# Patient Record
Sex: FEMALE
[De-identification: names, ages, dates, MRNs, and addresses within clinical notes are randomized per-mention and may not be internally consistent; named-entity substitution may affect disease eponyms.]

---

## 2020-03-26 ENCOUNTER — HOSPITAL ENCOUNTER (INPATIENT)
Dept: HOSPITAL 56 - MW.OB | Age: 35
LOS: 2 days | Discharge: HOME | End: 2020-03-28
Attending: OBSTETRICS & GYNECOLOGY | Admitting: OBSTETRICS & GYNECOLOGY
Payer: MEDICAID

## 2020-03-26 DIAGNOSIS — O34.219: ICD-10-CM

## 2020-03-26 DIAGNOSIS — Z3A.38: ICD-10-CM

## 2020-03-26 PROCEDURE — 10D00Z2 EXTRACTION OF PRODUCTS OF CONCEPTION, EXTRAPERITONEAL, OPEN APPROACH: ICD-10-PCS | Performed by: OBSTETRICS & GYNECOLOGY

## 2020-03-26 RX ADMIN — KETOROLAC TROMETHAMINE SCH MG: 30 INJECTION, SOLUTION INTRAMUSCULAR at 09:35

## 2020-03-26 RX ADMIN — KETOROLAC TROMETHAMINE SCH MG: 30 INJECTION, SOLUTION INTRAMUSCULAR at 19:23

## 2020-03-26 NOTE — PCM.PREANE
Preanesthetic Assessment





- Anesthesia/Transfusion/Family Hx


Anesthesia History: Prior Anesthesia Without Reaction (Epidural, Spinal, and GA 

without complications)


Family History of Anesthesia Reaction: No


Transfusion History: No Prior Transfusion(s)





- Review of Systems


General: No Symptoms


Pulmonary: No Symptoms


Cardiovascular: No Symptoms


Gastrointestinal: No Symptoms


Neurological: No Symptoms


Other: Reports: None





- Physical Assessment


NPO Status Date: 20


NPO Status Time: 22:00


Height: 5 ft 3 in


Weight: 101.605 kg


ASA Class: 2


Mental Status: Alert & Oriented x3


Airway Class: Mallampati = 2


Dentition: Reports: Normal Dentition


Thyro-Mental Finger Breadths: 3


Mouth Opening Finger Breadths: 3


ROM/Head Extension: Full


Lungs: Clear to Auscultation, Normal Respiratory Effort


Cardiovascular: Regular Rate, Regular Rhythm





- Lab


Values: 





 Laboratory Last Values











WBC  11.03 K/uL (4.0-11.0)  H  20  05:54    


 


RBC  4.36 M/uL (4.30-5.90)   20  05:54    


 


Hgb  10.3 g/dL (12.0-16.0)  L  20  05:54    


 


Hct  32.8 % (36.0-46.0)  L  20  05:54    


 


MCV  75.2 fL (80.0-98.0)  L  20  05:54    


 


MCH  23.6 pg (27.0-32.0)  L  20  05:54    


 


MCHC  31.4 g/dL (31.0-37.0)   20  05:54    


 


RDW Std Deviation  42.0 fl (28.0-62.0)   20  05:54    


 


RDW Coeff of Kaushik  15 % (11.0-15.0)   20  05:54    


 


Plt Count  216 K/uL (150-400)   20  05:54    


 


MPV  11.80 fL (7.40-12.00)   20  05:54    


 


Nucleated RBC %  0.0 /100WBC  20  05:54    


 


Nucleated RBCs #  0 K/uL  20  05:54    


 


Blood Type  A POSITIVE   20  05:54    


 


Antibody Screen  NEGATIVE   20  05:54    














- Allergies


Allergies/Adverse Reactions: 


 Allergies











Allergy/AdvReac Type Severity Reaction Status Date / Time


 


aspirin Allergy  Rash Verified 20 05:26


 


Latex, Natural Rubber Allergy  Rash Verified 20 06:30


 


Penicillins Allergy  Difficulty Verified 20 05:26





   Breathing  














- Acknowledgements


Anesthesia Type Planned: Spinal


Pt an Appropriate Candidate for the Planned Anesthesia: Yes


Alternatives and Risks of Anesthesia Discussed w Pt/Guardian: Yes


Pt/Guardian Understands and Agrees with Anesthesia Plan: Yes





PreAnesthesia Questionnaire





- Past Health History


Medical/Surgical History: Denies Medical/Surgical History


HEENT History: 


Other HEENT History: dental implants


Cardiovascular History: Reports: None


Respiratory History: Reports: Asthma (Last inhaler use at least 6 months ago, 

exercise induced)


Gastrointestinal History: Reports: None


Genitourinary History: Reports: Other (See Below)


Other Genitourinary History: hx UTI during pregnancy


OB/GYN History: Reports: Pregnancy


: 3


Para: 2


LMP (Approximate): Pregnant


Musculoskeletal History: Reports: None


Neurological History: Reports: None


Psychiatric History: Reports: None


Endocrine/Metabolic History: Reports: Obesity/BMI 30+


Hematologic History: Reports: Anemia


Immunologic History: Reports: None


Oncologic (Cancer) History: Reports: Bone


Other Oncologic History: Right thigh bone cancer - pt states this was excised 

and states its resolved to her knowledge


Dermatologic History: Reports: None





- Infectious Disease History


Infectious Disease History: Reports: None





- Past Surgical History


Head Surgeries/Procedures: Reports: None


HEENT Surgical History: Reports: None


Cardiovascular Surgical History: Reports: None


Respiratory Surgical History: Reports: None


GI Surgical History: Reports: None


Female  Surgical History: Reports:  Section (x2)


Endocrine Surgical History: Reports: None


Neurological Surgical History: Reports: None


Musculoskeletal Surgical History: Reports: Other (See Below)


Other Musculoskeletal Surgeries/Procedures:: hx rt leg surgery for a "rare form 

of cancer" as a teenager


Oncologic Surgical History: Reports: None


Dermatological Surgical History: Reports: None





- SUBSTANCE USE


Smoking Status *Q: Never Smoker


Tobacco Use Within Last Twelve Months: No


Second Hand Smoke Exposure: No


Recreational Drug Use History: No





- HOME MEDS


Home Medications: 


 Home Meds





Pnv No.95/Ferrous Fum/Folic AC [Prenatal Vitamin Tablet] 1 tab PO DAILY  [History]











- CURRENT (IN HOUSE) MEDS


Current Meds: 





 Current Medications





Lactated Ringer's (Ringers, Lactated)  1,000 mls @ 500 mls/hr IV BOLUS [facility]


   Last Admin: 20 05:55 Dose:  500 mls/hr


Oxytocin/Sodium Chloride (Oxytocin 30 Unit/500 Ml-Ns)  30 unit in 500 mls @ 250 

mls/hr IV TITRATE EDUARD


Sodium Chloride (Saline Flush)  10 ml FLUSH ASDIRECTED PRN


   PRN Reason: Keep Vein Open


Sodium Chloride (Saline Flush)  2.5 ml FLUSH ASDIRECTED PRN


   PRN Reason: Keep Vein Open


Sodium Chloride (Normal Saline)  10 ml IV ASDIRECTED PRN


   PRN Reason: IV Use





Discontinued Medications





Citric Acid/Sodium Citrate (Bicitra Solution)  30 ml PO ONETIME ONE


   Stop: 20 05:28


Clindamycin Phosphate 600 mg/ (Premix)  50 mls @ 100 mls/hr IV ONETIME ONE


   Stop: 20 06:14

## 2020-03-26 NOTE — PCM.OPNOTE
- General Post-Op/Procedure Note


Date of Surgery/Procedure: 03/26/20


Operative Procedure(s): Repeat C/section.


Pre Op Diagnosis: Previous C/section.GLI41mes.


Post-Op Diagnosis: Same


Anesthesia Technique: Spinal


Primary Surgeon: Adrien Wells


Assistant: Rafia Russell


EBL in mLs: 600


Complications: None


Condition: Good

## 2020-03-26 NOTE — PCM.LDHP
L&D History of Present Illness





- General


Date of Service: 20


Admit Problem/Dx: 


 Patient Status Order with Admit Dx/Problem





20 05:15


Patient Status [ADT] Routine 








 Admission Diagnosis/Problem











Admission Diagnosis/Problem    Pregnancy














20 07:59


35 yo  admitted at 38 4/7 weeks for repeat  delivery by Dr. Wells; A

+, Rubella Immune, GBS positive


Source of Information: Patient


History Limitations: Reports: No Limitations





- Related Data


Allergies/Adverse Reactions: 


 Allergies











Allergy/AdvReac Type Severity Reaction Status Date / Time


 


aspirin Allergy  Rash Verified 20 05:26


 


Latex, Natural Rubber Allergy  Rash Verified 20 06:30


 


Penicillins Allergy  Difficulty Verified 20 05:26





   Breathing  











Home Medications: 


 Home Meds





Pnv No.95/Ferrous Fum/Folic AC [Prenatal Vitamin Tablet] 1 tab PO DAILY  [History]











Past Medical History





- Past Health History


Medical/Surgical History: Denies Medical/Surgical History


HEENT History: 


Other HEENT History: dental implants


Cardiovascular History: Reports: None


Respiratory History: Reports: Asthma (Last inhaler use at least 6 months ago, 

exercise induced)


Gastrointestinal History: Reports: None


Genitourinary History: Reports: Other (See Below)


Other Genitourinary History: hx UTI during pregnancy


OB/GYN History: Reports: Pregnancy


Musculoskeletal History: Reports: None


Neurological History: Reports: None


Psychiatric History: Reports: None


Endocrine/Metabolic History: Reports: Obesity/BMI 30+


Hematologic History: Reports: Anemia


Immunologic History: Reports: None


Oncologic (Cancer) History: Reports: Bone


Other Oncologic History: Right thigh bone cancer - pt states this was excised 

and states its resolved to her knowledge


Dermatologic History: Reports: None





- Infectious Disease History


Infectious Disease History: Reports: None





- Past Surgical History


Head Surgeries/Procedures: Reports: None


HEENT Surgical History: Reports: None


Cardiovascular Surgical History: Reports: None


Respiratory Surgical History: Reports: None


GI Surgical History: Reports: None


Female  Surgical History: Reports:  Section (x2)


Endocrine Surgical History: Reports: None


Neurological Surgical History: Reports: None


Musculoskeletal Surgical History: Reports: Other (See Below)


Other Musculoskeletal Surgeries/Procedures:: hx rt leg surgery for a "rare form 

of cancer" as a teenager


Oncologic Surgical History: Reports: None


Dermatological Surgical History: Reports: None





Social & Family History





- Family History


Family Medical History: Noncontributory


Endocrine/Metabolic: Reports: Diabetes, type II


Oncologic: Reports: Breast





- Tobacco Use


Smoking Status *Q: Never Smoker


Second Hand Smoke Exposure: No





- Caffeine Use


Caffeine Use: Reports: Coffee





- Recreational Drug Use


Recreational Drug Use: No


Drug Use in Last 12 Months: No





H&P Review of Systems





- Review of Systems:


Review Of Systems: See Below


General: Reports: No Symptoms


HEENT: Reports: No Symptoms


Pulmonary: Reports: No Symptoms


Cardiovascular: Reports: No Symptoms


Gastrointestinal: Reports: No Symptoms


Genitourinary: Reports: No Symptoms


Musculoskeletal: Reports: No Symptoms


Skin: Reports: No Symptoms


Psychiatric: Reports: No Symptoms


Neurological: Reports: No Symptoms


Hematologic/Lymphatic: Reports: No Symptoms


Immunologic: Reports: No Symptoms





L&D Exam





- Exam


Exam: See Below





- Vital Signs


Weight: 224 lb





- Exam


General: Alert, Oriented, Cooperative


Lungs: Normal Respiratory Effort


Cardiovascular: Regular Rate, Regular Rhythm


GI/Abdominal Exam: Soft, Non-Tender


Rectal Exam: Deferred


Genitourinary: Deferred


Back Exam: Normal Inspection, Full Range of Motion


Extremities: Normal Inspection, Normal Range of Motion, Non-Tender, Normal 

Capillary Refill


Skin: Warm, Dry, Intact


Neurological: Normal Speech, Normal Tone, Sensation Intact


Psychiatric: Alert, Normal Affect, Normal Mood





- Patient Data


Lab Results Last 24 hrs: 


 Laboratory Results - last 24 hr











  20 Range/Units





  05:54 05:54 


 


WBC  11.03 H   (4.0-11.0)  K/uL


 


RBC  4.36   (4.30-5.90)  M/uL


 


Hgb  10.3 L   (12.0-16.0)  g/dL


 


Hct  32.8 L   (36.0-46.0)  %


 


MCV  75.2 L   (80.0-98.0)  fL


 


MCH  23.6 L   (27.0-32.0)  pg


 


MCHC  31.4   (31.0-37.0)  g/dL


 


RDW Std Deviation  42.0   (28.0-62.0)  fl


 


RDW Coeff of Kaushik  15   (11.0-15.0)  %


 


Plt Count  216   (150-400)  K/uL


 


MPV  11.80   (7.40-12.00)  fL


 


Nucleated RBC %  0.0   /100WBC


 


Nucleated RBCs #  0   K/uL


 


Blood Type   A POSITIVE  


 


Antibody Screen   NEGATIVE  











Result Diagrams: 


 20 05:54








- Problem List


(1) Supervision of normal IUP (intrauterine pregnancy) in multigravida


SNOMED Code(s): 704516715, 059039496, 011691539


   ICD Code: Z34.80 - ENCOUNTER FOR SUPRVSN OF NORMAL PREGNANCY, UNSP TRIMESTER

   Status: Acute   Priority: High   Current Visit: Yes   


Qualifiers: 


   Trimester: third trimester   Qualified Code(s): Z34.83 - Encounter for 

supervision of other normal pregnancy, third trimester   


Problem List Initiated/Reviewed/Updated: Yes


Orders Last 24hrs: 


 Active Orders 24 hr











 Category Date Time Status


 


 Patient Status [ADT] Routine ADT  20 05:15 Active


 


 Notify Provider Vital Signs [RC] PRN Care  20 05:31 Active


 


 Procedure Site Prep Instruct [RC] ASDIRECTED Care  20 05:27 Active


 


 Up ad Polina [RC] ASDIRECTED Care  20 05:27 Active


 


 Verify Patient Consent Obtain [RC] ASDIRECTED Care  20 05:27 Active


 


 Vital Signs [RC] PER UNIT ROUTINE Care  20 05:27 Active


 


 RPR (SYPHILIS SERO) W/ RFLX [REF] Routine Lab  20 05:54 Received


 


 Lactated Ringers [Ringers, Lactated] 1,000 ml Med  20 05:30 Active





 IV BOLUS   


 


 Oxytocin/0.9 % Sodium Chloride [Oxytocin 30 Unit/500 ML Med  20 05:30 

Active





 -NS]   





 30 unit in 500 ml IV TITRATE   


 


 Sodium Chloride 0.9% [Normal Saline] Med  20 05:27 Active





 10 ml IV ASDIRECTED PRN   


 


 Sodium Chloride 0.9% [Saline Flush] Med  20 05:27 Active





 10 ml FLUSH ASDIRECTED PRN   


 


 Sodium Chloride 0.9% [Saline Flush] Med  20 05:27 Active





 2.5 ml FLUSH ASDIRECTED PRN   


 


 Peripheral IV Insertion Adult [OM.PC] Routine Oth  20 05:27 Ordered


 


 Schedule Procedure [COMM] Per Unit Routine Oth  20 05:27 Ordered


 


 Resuscitation Status Routine Resus Stat  20 05:27 Ordered








 Medication Orders





Lactated Ringer's (Ringers, Lactated)  1,000 mls @ 500 mls/hr IV BOLUS EDUARD


   Last Admin: 20 07:38  Dose: 500 mls/hr


   Infusion: 20 07:38  Dose: 500 mls/hr


   Admin: 20 05:55  Dose: 500 mls/hr


Oxytocin/Sodium Chloride (Oxytocin 30 Unit/500 Ml-Ns)  30 unit in 500 mls @ 250 

mls/hr IV TITRATE EDUARD


Sodium Chloride (Saline Flush)  10 ml FLUSH ASDIRECTED PRN


   PRN Reason: Keep Vein Open


Sodium Chloride (Saline Flush)  2.5 ml FLUSH ASDIRECTED PRN


   PRN Reason: Keep Vein Open


Sodium Chloride (Normal Saline)  10 ml IV ASDIRECTED PRN


   PRN Reason: IV Use








Assessment/Plan Comment:: 





A: 35 yo  admitted at 38 4/7 weeks for repeat  delivery by Dr. Wells

; A+, Rubella Immune, GBS positive


P:  Repeat  delivery; routine postpartum plan of care.

## 2020-03-26 NOTE — OR
SURGEON:

Adrien Wells MD

 

DATE OF PROCEDURE:  2020

 

PREOPERATIVE DIAGNOSIS:

Intrauterine pregnancy, 39 weeks; previous  section; admitted for

elective repeat  section.

 

POSTOPERATIVE DIAGNOSIS:

Intrauterine pregnancy, 39 weeks; previous  section; admitted for

elective repeat  section.

 

OPERATION PERFORMED:

Elective repeat low-transverse  section.

 

ASSISTANT:

Rafia Russell, certified nurse midwife.

 

ANESTHESIA:

Spinal.

 

ESTIMATED BLOOD LOSS:

650 mL.

 

COMPLICATIONS:

None.

 

FINDING:

Male fetus.  Apgar score reported to be 8 and 9.  Normal uterus, tubes, and

ovaries.

 

INDICATION FOR SURGERY:

This patient had two previous  sections.  She is term.  She is 39 weeks.

She is admitted for elective repeat  section.

 

PROCEDURE IN DETAIL:

The patient was brought to the OR and properly identified.  After taking time-

out, the patient was prepped and draped in sterile fashion as usual.  A low-

transverse Pfannenstiel skin incision through the old scar was done.  The Tab

fascia and rectus fascia were opened in the direction of the incision.  The two

recti muscles were  and peritoneal cavity was entered.  A low-

transverse uterine incision was done and extended manually with the hand.  Fetus

was in the vertex position, delivered without any problem, and cried

immediately.  Apgar score later on reported to be 9 and 9, and the weight is not

available.  The placenta delivered spontaneous, complete, and intact.  Repair of

the lower uterine segment was done with 2-0 Vicryl continuous interlocking in 2

layers.  Reperitonealization done with 3-0 Vicryl continuous and then the

peritoneal cavity evacuated completely from all blood and blood clots, and

closed with 3-0 Vicryl continuous.  The rectus fascia was closed with #1 PDS

continuous, Tab fascia with 3-0 Vicryl continuous, and skin closed with 3-0

on a Gregg needle in a subcuticular fashion and Dermabond.  Instrument and

sponge counts were correct.  The patient tolerated the procedure well and went

to recovery room in stable general condition.

 

 

SONYA / NICA

DD:  2020 09:10:01

DT:  2020 10:55:24

Job #:  [id number]

## 2020-03-26 NOTE — PCM.POSTAN
POST ANESTHESIA ASSESSMENT





- MENTAL STATUS


Mental Status: Alert





- RESPIRATORY


Respiratory Status: Respiratory Rate WNL





- CARDIOVASCULAR


CV Status: Pulse Rate WNL





- GASTROINTESTINAL


GI Status: No Symptoms





- PAIN


Pain Score: 0 (SAB regressing)





- POST OP HYDRATION


Hydration Status: Adequate & Stable





- OBSERVATIONS


Free Text/Narrative:: 





Doing well.  VSS.  No problems noted.

## 2020-03-27 RX ADMIN — KETOROLAC TROMETHAMINE SCH MG: 30 INJECTION, SOLUTION INTRAMUSCULAR at 08:10

## 2020-03-27 RX ADMIN — KETOROLAC TROMETHAMINE SCH MG: 30 INJECTION, SOLUTION INTRAMUSCULAR at 02:22

## 2020-03-27 NOTE — PCM.PNPP
- General Info


Date of Service: 20


Functional Status: Reports: Pain Controlled





- Review of Systems


General: Reports: No Symptoms


HEENT: Reports: No Symptoms


Pulmonary: Reports: No Symptoms


Cardiovascular: Reports: No Symptoms


Gastrointestinal: Reports: No Symptoms


Genitourinary: Reports: No Symptoms


Musculoskeletal: Reports: No Symptoms


Skin: Reports: No Symptoms


Neurological: Reports: No Symptoms


Psychiatric: Reports: No Symptoms





- General Info


Date of Service: 20





- Patient Data


Vital Signs - Most Recent: 


 Last Vital Signs











Temp  37.2 C   20 04:00


 


Pulse  82   20 09:00


 


Resp  16   20 09:00


 


BP  98/55 L  20 07:00


 


Pulse Ox  96   20 09:00











Weight - Most Recent: 101.605 kg


I&O - Last 24 Hours: 


 Intake & Output











 20





 22:59 06:59 14:59


 


Output Total 200 1900 


 


Balance -200 -1900 











Lab Results - Last 24 Hours: 


 Laboratory Results - last 24 hr











  20 Range/Units





  05:54 05:42 


 


Hgb   9.0 L  (12.0-16.0)  g/dL


 


Hct   28.5 L  (36.0-46.0)  %


 


RPR  Non-Reac   (Non-Reac)  











Med Orders - Current: 


 Current Medications





Bisacodyl (Dulcolax)  10 mg RECTAL ONETIME PRN


   PRN Reason: Constipation


Diphenhydramine HCl (Benadryl)  25 mg IVPUSH Q6H PRN


   PRN Reason: Itching or Nausea


Docusate Sodium (Colace)  100 mg PO BID [facility]


   Last Admin: 20 21:42 Dose:  100 mg


Emollient Ointment (Lansinoh Hpa)  0 gm TOP ASDIRECTED PRN


   PRN Reason: Sore Nipples


Lactated Ringer's (Ringers, Lactated)  1,000 mls @ 500 mls/hr IV BOLUS [facility]


   Last Admin: 20 07:38 Dose:  500 mls/hr


Oxytocin/Sodium Chloride (Oxytocin 30 Unit/500 Ml-Ns)  30 unit in 500 mls @ 250 

mls/hr IV TITRATE [facility]


Tranexamic Acid 1,000 mg/ (Sodium Chloride)  110 mls @ 660 mls/hr IV ONETIME PRN


   PRN Reason: Bleeding


Lactated Ringer's (Ringers, Lactated)  1,000 mls @ 125 mls/hr IV ASDIRECTED [facility]


   Last Infusion: 20 02:24 Dose:  125 mls/hr


Oxytocin/Lactated Ringer's (Pitocin In Lr 30 Units/500 Ml)  30 unit in 500 mls 

@ 500 mls/hr IV TITRATE [facility]


Ibuprofen (Motrin)  800 mg PO Q8H PRN


   PRN Reason: mild pain or fever


Methylergonovine Maleate (Methergine)  0.2 mg IM ONETIME PRN


   PRN Reason: Excessive Vaginal Bleeding


Misoprostol (Cytotec)  1,000 mcg RECTAL ONETIME PRN


   PRN Reason: excessive bleeding


Ondansetron HCl (Zofran)  4 mg IVPUSH Q4H PRN


   PRN Reason: Nausea/Vomiting


Oxycodone/Acetaminophen (Percocet 325-5 Mg)  1 tab PO Q4H PRN


   PRN Reason: Pain (moderate 4-6)


   Last Admin: 20 12:34 Dose:  1 tab


Oxycodone/Acetaminophen (Percocet 325-5 Mg)  2 tab PO Q4H PRN


   PRN Reason: Pain (moderate 4-6)


Oxytocin (Pitocin)  10 unit IM ASDIRECTED PRN


   PRN Reason: Excessive Vaginal Bleeding


Sodium Chloride (Saline Flush)  10 ml FLUSH ASDIRECTED PRN


   PRN Reason: Keep Vein Open


Sodium Chloride (Saline Flush)  2.5 ml FLUSH ASDIRECTED PRN


   PRN Reason: Keep Vein Open


Sodium Chloride (Normal Saline)  10 ml IV ASDIRECTED PRN


   PRN Reason: IV Use





Discontinued Medications





Citric Acid/Sodium Citrate (Bicitra Solution)  30 ml PO ONETIME ONE


   Stop: 20 05:28


   Last Admin: 20 07:54 Dose:  Not Given


Diphenhydramine HCl (Benadryl)  25 mg IVPUSH Q4H PRN


   PRN Reason: Itching


   Stop: 20 09:48


Ephedrine Sulfate (Ephedrine Sulfate) Confirm Administered Dose 100 mg .ROUTE 

.STK-MED ONE


   Stop: 20 07:29


Clindamycin Phosphate 600 mg/ (Premix)  50 mls @ 100 mls/hr IV ONETIME ONE


   Stop: 20 06:14


   Last Admin: 20 07:38 Dose:  100 mls/hr


Sodium Chloride (Normal Saline) Confirm Administered Dose 20 mls @ as directed 

.ROUTE .STK-MED ONE


   Stop: 20 07:31


Acetaminophen (Ofirmev) Confirm Administered Dose 100 mls @ as directed .ROUTE 

.STK-MED ONE


   Stop: 20 07:43


   Last Admin: 20 07:54 Dose:  Not Given


Ketorolac Tromethamine (Toradol) Confirm Administered Dose 30 mg .ROUTE .STK-

MED ONE


   Stop: 20 09:33


   Last Admin: 20 09:42 Dose:  30 mg


Ketorolac Tromethamine (Toradol)  30 mg IVPUSH Q6H [facility]


   Stop: 20 10:01


   Last Admin: 20 19:23 Dose:  30 mg


Ketorolac Tromethamine (Toradol)  30 mg IVPUSH Q6H [facility]


   Stop: 20 13:31


   Last Admin: 20 08:10 Dose:  30 mg


Ketorolac Tromethamine (Toradol) Confirm Administered Dose 30 mg .ROUTE .STK-

MED ONE


   Stop: 20 02:18


   Last Admin: 20 07:55 Dose:  Not Given


Ketorolac Tromethamine (Toradol) Confirm Administered Dose 30 mg .ROUTE .STK-

MED ONE


   Stop: 20 08:03


Morphine Sulfate (Duramorph Pf) Confirm Administered Dose 10 mg .ROUTE .STK-MED 

ONE


   Stop: 20 07:34


Nalbuphine HCl (Nubain)  5 mg IVPUSH Q3H PRN


   PRN Reason: Pruritis


   Stop: 20 09:47


Naloxone HCl (Narcan)  0.1 mg IVPUSH ONETIME PRN


   PRN Reason: Respiratory Depression


   Stop: 20 09:48


Octyl Cyanoacrylate (Dermabond Advance) Confirm Administered Dose 1 applic 

.ROUTE .STK-MED ONE


   Stop: 20 07:41


   Last Admin: 20 07:54 Dose:  Not Given


Ondansetron HCl (Zofran) Confirm Administered Dose 4 mg .ROUTE .STK-MED ONE


   Stop: 20 07:28


Oxytocin (Pitocin) Confirm Administered Dose 30 unit .ROUTE .STK-MED ONE


   Stop: 20 07:29


Propofol (Diprivan  20 Ml) Confirm Administered Dose 200 mg .ROUTE .STK-MED ONE


   Stop: 20 07:28











- Infant Interaction


Infant Disposition, Postpartum: [place] in Room with Family


Infant Interaction: Holding Infant


Infant Feeding: Attempted Breastfeeding; Nursed Fair/Poor


Support Person: Significant Other





- Postpartum Recovery Exam


Fundal Tone: Firm


Fundal Level: 1 Fingerbreadths Below Umbilicus


Fundal Placement: Midline


Lochia Amount: Small


Lochia Color: Rubra/Red


Perineum Description: Intact, Minimal Bruising/Swelling


Episiotomy/Laceration: None


Bladder Status: Nonpalpable


Urinary Elimination: Indwelling Catheter





- Exam


General: Alert, Oriented


HEENT: Pupils Equal


Neck: Supple


Lungs: Clear to Auscultation, Normal Respiratory Effort


Cardiovascular: Regular Rate, Regular Rhythm


GI/Abdominal Exam: Normal Bowel Sounds, Soft, Non-Tender, No Organomegaly, No 

Distention, No Abnormal Bruit, No Mass, Pelvis Stable


Extremities: Normal Inspection, Normal Range of Motion, Non-Tender, No Pedal 

Edema, Normal Capillary Refill


Skin: Warm, Dry, Intact


Wound/Incisions: Healing Well


Neurological: No New Focal Deficit


Psy/Mental Status: Alert, Normal Affect, Normal Mood





- Problem List Review


Problem List Initiated/Reviewed/Updated: Yes





- My Orders


Last 24 Hours: 


My Active Orders





20 21:00


Docusate Sodium [Colace]   100 mg PO BID 














- Assessment


Assessment:: 





Status post  section postoperative day #1 patient is doing well her 

pain is under control she is ambulatory on regular diet and voiding without any 

problem passing gas in the incision is clean and dry.





- Plan


Plan:: 





A: 35 yo  admitted at 38 4/7 weeks for repeat  delivery by Dr. Wells

; A+, Rubella Immune, GBS positive


P:  Repeat  delivery; routine postpartum plan of care. 


3/27/20.


Planning to send the patient home in a.m.

## 2020-03-28 NOTE — PCM.PNPP
- General Info


Date of Service: 20


Functional Status: Reports: Pain Controlled





- Review of Systems


General: Reports: No Symptoms


HEENT: Reports: No Symptoms


Pulmonary: Reports: No Symptoms


Cardiovascular: Reports: No Symptoms


Gastrointestinal: Reports: No Symptoms


Genitourinary: Reports: No Symptoms


Musculoskeletal: Reports: No Symptoms


Skin: Reports: No Symptoms


Neurological: Reports: No Symptoms


Psychiatric: Reports: No Symptoms





- General Info


Date of Service: 20





- Patient Data


Vital Signs - Most Recent: 


 Last Vital Signs











Temp  36.3 C   20 07:23


 


Pulse  86   20 07:23


 


Resp  16   20 07:23


 


BP  105/68   20 07:23


 


Pulse Ox  94 L  20 07:23











Weight - Most Recent: 101.605 kg


Lab Results - Last 24 Hours: 


 Laboratory Results - last 24 hr











  20 Range/Units





  05:54 


 


RPR  Non-Reac  (Non-Reac)  











Med Orders - Current: 


 Current Medications





Acetaminophen (Tylenol Extra Strength)  500 - 1,000 mg PO Q6H PRN


   PRN Reason: Pain


   Last Admin: 20 04:36 Dose:  1,000 mg


Bisacodyl (Dulcolax)  10 mg RECTAL ONETIME PRN


   PRN Reason: Constipation


Diphenhydramine HCl (Benadryl)  25 mg IVPUSH Q6H PRN


   PRN Reason: Itching or Nausea


Docusate Sodium (Colace)  100 mg PO BID [facility]UARD


   Last Admin: 20 08:29 Dose:  100 mg


Emollient Ointment (Lansinoh Hpa)  0 gm TOP ASDIRECTED PRN


   PRN Reason: Sore Nipples


   Last Admin: 20 23:44 Dose:  1 applic


Lactated Ringer's (Ringers, Lactated)  1,000 mls @ 500 mls/hr IV BOLUS [facility]


   Last Admin: 20 07:38 Dose:  500 mls/hr


Oxytocin/Sodium Chloride (Oxytocin 30 Unit/500 Ml-Ns)  30 unit in 500 mls @ 250 

mls/hr IV TITRATE [facility]


Tranexamic Acid 1,000 mg/ (Sodium Chloride)  110 mls @ 660 mls/hr IV ONETIME PRN


   PRN Reason: Bleeding


Lactated Ringer's (Ringers, Lactated)  1,000 mls @ 125 mls/hr IV ASDIRECTED EDUARD


   Last Infusion: 20 02:24 Dose:  125 mls/hr


Oxytocin/Lactated Ringer's (Pitocin In Lr 30 Units/500 Ml)  30 unit in 500 mls 

@ 500 mls/hr IV TITRATE [facility]


Ibuprofen (Motrin)  800 mg PO Q8H PRN


   PRN Reason: mild pain or fever


   Last Admin: 20 08:28 Dose:  800 mg


Methylergonovine Maleate (Methergine)  0.2 mg IM ONETIME PRN


   PRN Reason: Excessive Vaginal Bleeding


Misoprostol (Cytotec)  1,000 mcg RECTAL ONETIME PRN


   PRN Reason: excessive bleeding


Ondansetron HCl (Zofran)  4 mg IVPUSH Q4H PRN


   PRN Reason: Nausea/Vomiting


Oxycodone HCl (Oxycodone)  5 mg PO Q4H PRN


   PRN Reason: Pain


   Last Admin: 20 05:51 Dose:  5 mg


Oxycodone/Acetaminophen (Percocet 325-5 Mg)  1 tab PO Q4H PRN


   PRN Reason: Pain (moderate 4-6)


   Last Admin: 20 12:34 Dose:  1 tab


Oxycodone/Acetaminophen (Percocet 325-5 Mg)  2 tab PO Q4H PRN


   PRN Reason: Pain (moderate 4-6)


Oxytocin (Pitocin)  10 unit IM ASDIRECTED PRN


   PRN Reason: Excessive Vaginal Bleeding


Sodium Chloride (Saline Flush)  10 ml FLUSH ASDIRECTED PRN


   PRN Reason: Keep Vein Open


Sodium Chloride (Saline Flush)  2.5 ml FLUSH ASDIRECTED PRN


   PRN Reason: Keep Vein Open


Sodium Chloride (Normal Saline)  10 ml IV ASDIRECTED PRN


   PRN Reason: IV Use





Discontinued Medications





Acetaminophen (Tylenol Extra Strength)  0 mg PO Q6H PRN


   PRN Reason: Pain


Citric Acid/Sodium Citrate (Bicitra Solution)  30 ml PO ONETIME ONE


   Stop: 20 05:28


   Last Admin: 20 07:54 Dose:  Not Given


Diphenhydramine HCl (Benadryl)  25 mg IVPUSH Q4H PRN


   PRN Reason: Itching


   Stop: 20 09:48


Ephedrine Sulfate (Ephedrine Sulfate) Confirm Administered Dose 100 mg .ROUTE 

.STK-MED ONE


   Stop: 20 07:29


Clindamycin Phosphate 600 mg/ (Premix)  50 mls @ 100 mls/hr IV ONETIME ONE


   Stop: 20 06:14


   Last Admin: 20 07:38 Dose:  100 mls/hr


Sodium Chloride (Normal Saline) Confirm Administered Dose 20 mls @ as directed 

.ROUTE .STK-MED ONE


   Stop: 20 07:31


Acetaminophen (Ofirmev) Confirm Administered Dose 100 mls @ as directed .ROUTE 

.STK-MED ONE


   Stop: 20 07:43


   Last Admin: 20 07:54 Dose:  Not Given


Ketorolac Tromethamine (Toradol) Confirm Administered Dose 30 mg .ROUTE .STK-

MED ONE


   Stop: 20 09:33


   Last Admin: 20 09:42 Dose:  30 mg


Ketorolac Tromethamine (Toradol)  30 mg IVPUSH Q6H [facility]


   Stop: 20 10:01


   Last Admin: 20 19:23 Dose:  30 mg


Ketorolac Tromethamine (Toradol)  30 mg IVPUSH Q6H [facility]


   Stop: 20 13:31


   Last Admin: 20 08:10 Dose:  30 mg


Ketorolac Tromethamine (Toradol) Confirm Administered Dose 30 mg .ROUTE .STK-

MED ONE


   Stop: 20 02:18


   Last Admin: 20 07:55 Dose:  Not Given


Ketorolac Tromethamine (Toradol) Confirm Administered Dose 30 mg .ROUTE .STK-

MED ONE


   Stop: 20 08:03


Morphine Sulfate (Duramorph Pf) Confirm Administered Dose 10 mg .ROUTE .STK-MED 

ONE


   Stop: 20 07:34


Nalbuphine HCl (Nubain)  5 mg IVPUSH Q3H PRN


   PRN Reason: Pruritis


   Stop: 20 09:47


Naloxone HCl (Narcan)  0.1 mg IVPUSH ONETIME PRN


   PRN Reason: Respiratory Depression


   Stop: 20 09:48


Octyl Cyanoacrylate (Dermabond Advance) Confirm Administered Dose 1 applic 

.ROUTE .STK-MED ONE


   Stop: 20 07:41


   Last Admin: 20 07:54 Dose:  Not Given


Ondansetron HCl (Zofran) Confirm Administered Dose 4 mg .ROUTE .STK-MED ONE


   Stop: 20 07:28


Oxycodone HCl (Oxycodone) Confirm Administered Dose 5 mg .ROUTE .STK-MED ONE


   Stop: 20 05:49


   Last Admin: 20 08:30 Dose:  Not Given


Oxytocin (Pitocin) Confirm Administered Dose 30 unit .ROUTE .STK-MED ONE


   Stop: 20 07:29


Propofol (Diprivan  20 Ml) Confirm Administered Dose 200 mg .ROUTE .STK-MED ONE


   Stop: 20 07:28











- Infant Interaction


Infant Disposition, Postpartum:  in Room with Family


Infant Interaction: Holding Infant


Infant Feeding: Attempted Breastfeeding; Nursed Fair/Poor


Support Person: Significant Other





- Postpartum Recovery Exam


Fundal Tone: Firm


Fundal Level: 1 Fingerbreadths Below Umbilicus


Fundal Placement: Midline


Lochia Amount: Small


Lochia Color: Rubra/Red


Perineum Description: Intact, Minimal Bruising/Swelling


Episiotomy/Laceration: None


Bladder Status: Nonpalpable


Urinary Elimination: Indwelling Catheter





- Exam


General: Alert, Oriented


HEENT: Pupils Equal


Neck: Supple


Lungs: Clear to Auscultation, Normal Respiratory Effort


Cardiovascular: Regular Rate, Regular Rhythm


GI/Abdominal Exam: Normal Bowel Sounds, Soft, Non-Tender, No Organomegaly, No 

Distention, No Abnormal Bruit, No Mass, Pelvis Stable


Extremities: Normal Inspection, Normal Range of Motion, Non-Tender, No Pedal 

Edema, Normal Capillary Refill


Skin: Warm, Dry, Intact


Wound/Incisions: Healing Well


Neurological: No New Focal Deficit


Psy/Mental Status: Alert, Normal Affect, Normal Mood





- Problem List Review


Problem List Initiated/Reviewed/Updated: Yes





- My Orders


Last 24 Hours: 


My Active Orders





20 21:42


Acetaminophen [Tylenol Extra Strength]   500 - 1,000 mg PO Q6H PRN 





20 05:41


oxyCODONE   5 mg PO Q4H PRN 














- Assessment


Assessment:: 





Status post  section postoperative day #1 patient is doing well her 

pain is under control she is ambulatory on regular diet and voiding without any 

problem passing gas in the incision is clean and dry.





- Plan


Plan:: 





A: 35 yo  admitted at 38 4/7 weeks for repeat  delivery by Dr. Wells

; A+, Rubella Immune, GBS positive


P:  Repeat  delivery; routine postpartum plan of care. 


3/27/20.


Planning to send the patient home in a.m.

## 2020-03-28 NOTE — PCM.DCSUM1
**Discharge Summary





- Hospital Course


Diagnosis: Stroke: No





- Discharge Data


Discharge Date: 20


Discharge Disposition: Home, Self-Care 01


Condition: Good





- Referral to Home Health


Primary Care Physician: 


Emil Mckeon MD








- Patient Summary/Data


Operative Procedure(s) Performed: Repeat C/section.





- Patient Instructions


Diet: Usual Diet as Tolerated


Activity: As Tolerated


Driving: Do Not Drive


Showering/Bathing: May Shower


Wound/Incision Care: Keep Operative Site/Wound Site Clean and Dry





- Discharge Plan


Home Medications: 


 Home Meds





Pnv No.95/Ferrous Fum/Folic AC [Prenatal Vitamin Tablet] 1 tab PO DAILY  [History]








Patient Handouts:   Delivery, Care After


Referrals: 


[facility] [Outside]


Adrien Wells MD [Physician] - 


(1 week- @ 1:30pm w/ 


6 week-May 8th@ 3:00pm w/ )





- Discharge Summary/Plan Comment


DC Time >30 min.: Yes





- General Info


Date of Service: 20


Functional Status: Reports: Pain Controlled





- Review of Systems


General: Reports: No Symptoms


HEENT: Reports: No Symptoms


Pulmonary: Reports: No Symptoms


Cardiovascular: Reports: No Symptoms


Gastrointestinal: Reports: No Symptoms


Genitourinary: Reports: No Symptoms


Musculoskeletal: Reports: No Symptoms


Skin: Reports: No Symptoms


Neurological: Reports: No Symptoms


Psychiatric: Reports: No Symptoms





- Patient Data


Vitals - Most Recent: 


 Last Vital Signs











Temp  36.3 C   20 07:23


 


Pulse  86   20 07:23


 


Resp  16   20 07:23


 


BP  105/68   20 07:23


 


Pulse Ox  94 L  20 07:23











Weight - Most Recent: 101.605 kg


Lab Results - Last 24 hrs: 


 Laboratory Results - last 24 hr











  20 Range/Units





  05:54 


 


RPR  Non-Reac  (Non-Reac)  











Med Orders - Current: 


 Current Medications





Acetaminophen (Tylenol Extra Strength)  500 - 1,000 mg PO Q6H PRN


   PRN Reason: Pain


   Last Admin: 20 04:36 Dose:  1,000 mg


Bisacodyl (Dulcolax)  10 mg RECTAL ONETIME PRN


   PRN Reason: Constipation


Diphenhydramine HCl (Benadryl)  25 mg IVPUSH Q6H PRN


   PRN Reason: Itching or Nausea


Docusate Sodium (Colace)  100 mg PO BID [facility]


   Last Admin: 20 08:29 Dose:  100 mg


Emollient Ointment (Lansinoh Hpa)  0 gm TOP ASDIRECTED PRN


   PRN Reason: Sore Nipples


   Last Admin: 20 23:44 Dose:  1 applic


Lactated Ringer's (Ringers, Lactated)  1,000 mls @ 500 mls/hr IV BOLUS [facility]


   Last Admin: 20 07:38 Dose:  500 mls/hr


Oxytocin/Sodium Chloride (Oxytocin 30 Unit/500 Ml-Ns)  30 unit in 500 mls @ 250 

mls/hr IV TITRATE [facility]


Tranexamic Acid 1,000 mg/ (Sodium Chloride)  110 mls @ 660 mls/hr IV ONETIME PRN


   PRN Reason: Bleeding


Lactated Ringer's (Ringers, Lactated)  1,000 mls @ 125 mls/hr IV ASDIRECTED [facility]


   Last Infusion: 20 02:24 Dose:  125 mls/hr


Oxytocin/Lactated Ringer's (Pitocin In Lr 30 Units/500 Ml)  30 unit in 500 mls 

@ 500 mls/hr IV TITRATE [facility]


Ibuprofen (Motrin)  800 mg PO Q8H PRN


   PRN Reason: mild pain or fever


   Last Admin: 20 08:28 Dose:  800 mg


Methylergonovine Maleate (Methergine)  0.2 mg IM ONETIME PRN


   PRN Reason: Excessive Vaginal Bleeding


Misoprostol (Cytotec)  1,000 mcg RECTAL ONETIME PRN


   PRN Reason: excessive bleeding


Ondansetron HCl (Zofran)  4 mg IVPUSH Q4H PRN


   PRN Reason: Nausea/Vomiting


Oxycodone HCl (Oxycodone)  5 mg PO Q4H PRN


   PRN Reason: Pain


   Last Admin: 20 05:51 Dose:  5 mg


Oxycodone/Acetaminophen (Percocet 325-5 Mg)  1 tab PO Q4H PRN


   PRN Reason: Pain (moderate 4-6)


   Last Admin: 20 12:34 Dose:  1 tab


Oxycodone/Acetaminophen (Percocet 325-5 Mg)  2 tab PO Q4H PRN


   PRN Reason: Pain (moderate 4-6)


Oxytocin (Pitocin)  10 unit IM ASDIRECTED PRN


   PRN Reason: Excessive Vaginal Bleeding


Sodium Chloride (Saline Flush)  10 ml FLUSH ASDIRECTED PRN


   PRN Reason: Keep Vein Open


Sodium Chloride (Saline Flush)  2.5 ml FLUSH ASDIRECTED PRN


   PRN Reason: Keep Vein Open


Sodium Chloride (Normal Saline)  10 ml IV ASDIRECTED PRN


   PRN Reason: IV Use





Discontinued Medications





Acetaminophen (Tylenol Extra Strength)  0 mg PO Q6H PRN


   PRN Reason: Pain


Citric Acid/Sodium Citrate (Bicitra Solution)  30 ml PO ONETIME ONE


   Stop: 20 05:28


   Last Admin: 20 07:54 Dose:  Not Given


Diphenhydramine HCl (Benadryl)  25 mg IVPUSH Q4H PRN


   PRN Reason: Itching


   Stop: 20 09:48


Ephedrine Sulfate (Ephedrine Sulfate) Confirm Administered Dose 100 mg .ROUTE 

.STK-MED ONE


   Stop: 20 07:29


Clindamycin Phosphate 600 mg/ (Premix)  50 mls @ 100 mls/hr IV ONETIME ONE


   Stop: 20 06:14


   Last Admin: 20 07:38 Dose:  100 mls/hr


Sodium Chloride (Normal Saline) Confirm Administered Dose 20 mls @ as directed 

.ROUTE .STK-MED ONE


   Stop: 20 07:31


Acetaminophen (Ofirmev) Confirm Administered Dose 100 mls @ as directed .ROUTE 

.STK-MED ONE


   Stop: 20 07:43


   Last Admin: 20 07:54 Dose:  Not Given


Ketorolac Tromethamine (Toradol) Confirm Administered Dose 30 mg .ROUTE .STK-

MED ONE


   Stop: 20 09:33


   Last Admin: 20 09:42 Dose:  30 mg


Ketorolac Tromethamine (Toradol)  30 mg IVPUSH Q6H [facility]


   Stop: 20 10:01


   Last Admin: 20 19:23 Dose:  30 mg


Ketorolac Tromethamine (Toradol)  30 mg IVPUSH Q6H [facility]


   Stop: 20 13:31


   Last Admin: 20 08:10 Dose:  30 mg


Ketorolac Tromethamine (Toradol) Confirm Administered Dose 30 mg .ROUTE .STK-

MED ONE


   Stop: 20 02:18


   Last Admin: 20 07:55 Dose:  Not Given


Ketorolac Tromethamine (Toradol) Confirm Administered Dose 30 mg .ROUTE .STK-

MED ONE


   Stop: 20 08:03


Morphine Sulfate (Duramorph Pf) Confirm Administered Dose 10 mg .ROUTE .STK-MED 

ONE


   Stop: 20 07:34


Nalbuphine HCl (Nubain)  5 mg IVPUSH Q3H PRN


   PRN Reason: Pruritis


   Stop: 20 09:47


Naloxone HCl (Narcan)  0.1 mg IVPUSH ONETIME PRN


   PRN Reason: Respiratory Depression


   Stop: 20 09:48


Octyl Cyanoacrylate (Dermabond Advance) Confirm Administered Dose 1 applic 

.ROUTE .STK-MED ONE


   Stop: 20 07:41


   Last Admin: 20 07:54 Dose:  Not Given


Ondansetron HCl (Zofran) Confirm Administered Dose 4 mg .ROUTE .STK-MED ONE


   Stop: 20 07:28


Oxycodone HCl (Oxycodone) Confirm Administered Dose 5 mg .ROUTE .STK-MED ONE


   Stop: 20 05:49


   Last Admin: 20 08:30 Dose:  Not Given


Oxytocin (Pitocin) Confirm Administered Dose 30 unit .ROUTE .STK-MED ONE


   Stop: 20 07:29


Propofol (Diprivan  20 Ml) Confirm Administered Dose 200 mg .ROUTE .STK-MED ONE


   Stop: 20 07:28











- Exam


General: Reports: Alert, Oriented


HEENT: Reports: Pupils Equal, Pupils Reactive, EOMI, Mucous Membr. Moist/Pink


Neck: Reports: Supple


Lungs: Reports: Clear to Auscultation, Normal Respiratory Effort


Cardiovascular: Reports: Regular Rate, Regular Rhythm


GI/Abdominal Exam: Normal Bowel Sounds, Soft, Non-Tender, No Organomegaly, No 

Distention, No Abnormal Bruit, No Mass, Pelvis Stable


 (Female) Exam: Normal External Exam, Normal Speculum Exam, Normal Bimanual 

Exam


Rectal (Female) Exam: Normal Exam, Normal Rectal Tone


Back Exam: Reports: Normal Inspection, Full Range of Motion


Extremities: Normal Inspection, Normal Range of Motion, Non-Tender, No Pedal 

Edema, Normal Capillary Refill


Skin: Reports: Warm, Dry, Intact


Wound/Incisions: Reports: Healing Well


Neurological: Reports: No New Focal Deficit


Psy/Mental Status: Reports: Alert, Normal Affect, Normal Mood

## 2022-08-13 ENCOUNTER — HOSPITAL ENCOUNTER (EMERGENCY)
Dept: HOSPITAL 56 - MW.ED | Age: 37
Discharge: HOME | End: 2022-08-13
Payer: MEDICAID

## 2022-08-13 DIAGNOSIS — Z91.040: ICD-10-CM

## 2022-08-13 DIAGNOSIS — K52.9: Primary | ICD-10-CM

## 2022-08-13 DIAGNOSIS — Z88.0: ICD-10-CM

## 2022-08-13 DIAGNOSIS — E66.9: ICD-10-CM

## 2022-08-13 DIAGNOSIS — Z20.822: ICD-10-CM

## 2022-08-13 DIAGNOSIS — R74.01: ICD-10-CM

## 2022-08-13 DIAGNOSIS — Z88.6: ICD-10-CM

## 2022-08-13 DIAGNOSIS — E87.6: ICD-10-CM

## 2022-08-13 DIAGNOSIS — Z79.899: ICD-10-CM

## 2022-08-13 LAB
BUN SERPL-MCNC: 5 MG/DL (ref 7–18)
CHLORIDE SERPL-SCNC: 103 MMOL/L (ref 98–107)
CO2 SERPL-SCNC: 29.5 MMOL/L (ref 21–32)
EGFRCR SERPLBLD CKD-EPI 2021: 119 ML/MIN (ref 60–?)
FLUAV RNA UPPER RESP QL NAA+PROBE: NEGATIVE
FLUBV RNA UPPER RESP QL NAA+PROBE: NEGATIVE
GLUCOSE SERPL-MCNC: 92 MG/DL (ref 74–106)
LIPASE SERPL-CCNC: 126 U/L (ref 73–393)
POTASSIUM SERPL-SCNC: 3 MMOL/L (ref 3.5–5.1)
SARS-COV-2 RNA RESP QL NAA+PROBE: NEGATIVE
SODIUM SERPL-SCNC: 142 MMOL/L (ref 136–145)

## 2022-08-13 PROCEDURE — 84703 CHORIONIC GONADOTROPIN ASSAY: CPT

## 2022-08-13 PROCEDURE — 87449 NOS EACH ORGANISM AG IA: CPT

## 2022-08-13 PROCEDURE — 87329 GIARDIA AG IA: CPT

## 2022-08-13 PROCEDURE — 87046 STOOL CULTR AEROBIC BACT EA: CPT

## 2022-08-13 PROCEDURE — 87324 CLOSTRIDIUM AG IA: CPT

## 2022-08-13 PROCEDURE — 96361 HYDRATE IV INFUSION ADD-ON: CPT

## 2022-08-13 PROCEDURE — 80053 COMPREHEN METABOLIC PANEL: CPT

## 2022-08-13 PROCEDURE — 81001 URINALYSIS AUTO W/SCOPE: CPT

## 2022-08-13 PROCEDURE — 0240U: CPT

## 2022-08-13 PROCEDURE — 83690 ASSAY OF LIPASE: CPT

## 2022-08-13 PROCEDURE — 85025 COMPLETE CBC W/AUTO DIFF WBC: CPT

## 2022-08-13 PROCEDURE — 96374 THER/PROPH/DIAG INJ IV PUSH: CPT

## 2022-08-13 PROCEDURE — 87045 FECES CULTURE AEROBIC BACT: CPT

## 2022-08-13 PROCEDURE — 99284 EMERGENCY DEPT VISIT MOD MDM: CPT

## 2022-08-13 PROCEDURE — 87328 CRYPTOSPORIDIUM AG IA: CPT

## 2022-08-13 PROCEDURE — 87899 AGENT NOS ASSAY W/OPTIC: CPT

## 2022-08-13 PROCEDURE — 36415 COLL VENOUS BLD VENIPUNCTURE: CPT

## 2022-08-13 PROCEDURE — 74177 CT ABD & PELVIS W/CONTRAST: CPT

## 2023-04-01 ENCOUNTER — HOSPITAL ENCOUNTER (EMERGENCY)
Dept: HOSPITAL 56 - MW.ED | Age: 38
Discharge: HOME | End: 2023-04-01
Payer: COMMERCIAL

## 2023-04-01 DIAGNOSIS — E66.9: ICD-10-CM

## 2023-04-01 DIAGNOSIS — Z88.0: ICD-10-CM

## 2023-04-01 DIAGNOSIS — R19.7: Primary | ICD-10-CM

## 2023-04-01 DIAGNOSIS — Z91.040: ICD-10-CM

## 2023-04-01 DIAGNOSIS — Z20.822: ICD-10-CM

## 2023-04-01 LAB
BUN SERPL-MCNC: 17 MG/DL (ref 7–18)
CHLORIDE SERPL-SCNC: 102 MMOL/L (ref 98–107)
CO2 SERPL-SCNC: 23.5 MMOL/L (ref 21–32)
EGFRCR SERPLBLD CKD-EPI 2021: 118 ML/MIN (ref 60–?)
FLUAV RNA UPPER RESP QL NAA+PROBE: NEGATIVE
FLUBV RNA UPPER RESP QL NAA+PROBE: NEGATIVE
GLUCOSE SERPL-MCNC: 120 MG/DL (ref 74–106)
LIPASE SERPL-CCNC: 146 U/L (ref 73–393)
POTASSIUM SERPL-SCNC: 3.4 MMOL/L (ref 3.5–5.1)
RSV RNA UPPER RESP QL NAA+PROBE: NEGATIVE
SARS-COV-2 RNA RESP QL NAA+PROBE: NEGATIVE
SODIUM SERPL-SCNC: 138 MMOL/L (ref 136–145)

## 2023-04-01 PROCEDURE — 80053 COMPREHEN METABOLIC PANEL: CPT

## 2023-04-01 PROCEDURE — 85025 COMPLETE CBC W/AUTO DIFF WBC: CPT

## 2023-04-01 PROCEDURE — 99284 EMERGENCY DEPT VISIT MOD MDM: CPT

## 2023-04-01 PROCEDURE — 96361 HYDRATE IV INFUSION ADD-ON: CPT

## 2023-04-01 PROCEDURE — 96374 THER/PROPH/DIAG INJ IV PUSH: CPT

## 2023-04-01 PROCEDURE — 83690 ASSAY OF LIPASE: CPT

## 2023-04-01 PROCEDURE — 0241U: CPT

## 2023-04-01 PROCEDURE — 84703 CHORIONIC GONADOTROPIN ASSAY: CPT

## 2023-04-01 PROCEDURE — 36415 COLL VENOUS BLD VENIPUNCTURE: CPT
